# Patient Record
Sex: MALE | Race: BLACK OR AFRICAN AMERICAN | NOT HISPANIC OR LATINO | Employment: OTHER | RURAL
[De-identification: names, ages, dates, MRNs, and addresses within clinical notes are randomized per-mention and may not be internally consistent; named-entity substitution may affect disease eponyms.]

---

## 2024-03-10 ENCOUNTER — HOSPITAL ENCOUNTER (EMERGENCY)
Facility: HOSPITAL | Age: 39
Discharge: HOME | End: 2024-03-10
Attending: EMERGENCY MEDICINE

## 2024-03-10 VITALS
OXYGEN SATURATION: 100 % | TEMPERATURE: 98 F | DIASTOLIC BLOOD PRESSURE: 62 MMHG | BODY MASS INDEX: 20.49 KG/M2 | HEART RATE: 89 BPM | HEIGHT: 65 IN | SYSTOLIC BLOOD PRESSURE: 121 MMHG | WEIGHT: 123 LBS | RESPIRATION RATE: 20 BRPM

## 2024-03-10 DIAGNOSIS — B00.9 HSV (HERPES SIMPLEX VIRUS) INFECTION: Primary | ICD-10-CM

## 2024-03-10 PROCEDURE — 99284 EMERGENCY DEPT VISIT MOD MDM: CPT | Mod: ,,, | Performed by: EMERGENCY MEDICINE

## 2024-03-10 PROCEDURE — 99284 EMERGENCY DEPT VISIT MOD MDM: CPT

## 2024-03-10 PROCEDURE — 25000003 PHARM REV CODE 250: Performed by: EMERGENCY MEDICINE

## 2024-03-10 RX ORDER — ACYCLOVIR 800 MG/1
800 TABLET ORAL
Qty: 50 TABLET | Refills: 0 | Status: SHIPPED | OUTPATIENT
Start: 2024-03-10 | End: 2024-03-20

## 2024-03-10 RX ORDER — FLUCONAZOLE 200 MG/1
200 TABLET ORAL DAILY
Qty: 7 TABLET | Refills: 0 | Status: SHIPPED | OUTPATIENT
Start: 2024-03-10 | End: 2024-03-17

## 2024-03-10 RX ORDER — LIDOCAINE AND PRILOCAINE 25; 25 MG/G; MG/G
CREAM TOPICAL
Status: COMPLETED | OUTPATIENT
Start: 2024-03-10 | End: 2024-03-10

## 2024-03-10 RX ADMIN — LIDOCAINE AND PRILOCAINE: 25; 25 CREAM TOPICAL at 04:03

## 2024-03-10 NOTE — ED PROVIDER NOTES
Encounter Date: 3/10/2024    SCRIBE #1 NOTE: I, Marisa Kita, am scribing for, and in the presence of,  Satish Rico MD. I have scribed the entire note.       History     Chief Complaint   Patient presents with    Groin Swelling     Pt c/o genital swelling and pain.      39 year old male presents to the ED complaining of groin swelling. Patient reports having pain and swelling to his genitals. No other complaints at this time. He reports being HIV+, but his says his CD4 count and viral load are good.      The history is provided by the patient. No  was used.     Review of patient's allergies indicates:  No Known Allergies  No past medical history on file.  No past surgical history on file.  No family history on file.     Review of Systems   Genitourinary:  Positive for penile pain and penile swelling.   All other systems reviewed and are negative.      Physical Exam     Initial Vitals [03/10/24 1534]   BP Pulse Resp Temp SpO2   121/62 89 20 98.2 °F (36.8 °C) 100 %      MAP       --         Physical Exam    Nursing note and vitals reviewed.  Eyes: Conjunctivae are normal. Pupils are equal, round, and reactive to light.   Neck:   Normal range of motion.  Cardiovascular:  Regular rhythm and normal heart sounds.           Pulmonary/Chest: Breath sounds normal.   Genitourinary:    Genitourinary Comments: Ulceration on foreskin.      Musculoskeletal:      Cervical back: Normal range of motion.     Neurological: He is alert and oriented to person, place, and time. He has normal strength and normal reflexes.         ED Course   Procedures  Labs Reviewed - No data to display       Imaging Results    None          Medications   LIDOcaine-prilocaine cream (has no administration in time range)     Medical Decision Making  Painful penile ulceration    Ddx:  hsv vs other    Acyclovir and diflucan     Risk  Prescription drug management.              Attending Attestation:           Physician Attestation  for Scribe:  Physician Attestation Statement for Scribe #1: I, Satish Rico MD, reviewed documentation, as scribed by Marisa East in my presence, and it is both accurate and complete.                                    Clinical Impression:  Final diagnoses:  [B00.9] HSV (herpes simplex virus) infection (Primary)          ED Disposition Condition    Discharge Stable          ED Prescriptions       Medication Sig Dispense Start Date End Date Auth. Provider    fluconazole (DIFLUCAN) 200 MG Tab Take 1 tablet (200 mg total) by mouth once daily. for 7 days 7 tablet 3/10/2024 3/17/2024 Satish Rico MD    acyclovir (ZOVIRAX) 800 MG Tab Take 1 tablet (800 mg total) by mouth 5 (five) times daily. for 10 days 50 tablet 3/10/2024 3/20/2024 Satish Rico MD          Follow-up Information       Follow up With Specialties Details Why Contact Info    [facility] - Emergency Department Emergency Medicine  As needed [id number] [place] [id number] [phone]             Satish Rico MD  03/10/24 3497

## 2024-03-10 NOTE — DISCHARGE INSTRUCTIONS
Take diflucan and acyclovir as directed.  Drink plenty of fluids.  Follow up if symptoms persist or worsen or otherwise as needed.

## 2024-04-02 ENCOUNTER — HOSPITAL ENCOUNTER (EMERGENCY)
Facility: HOSPITAL | Age: 39
Discharge: HOME | End: 2024-04-02

## 2024-04-02 VITALS
WEIGHT: 140 LBS | DIASTOLIC BLOOD PRESSURE: 76 MMHG | BODY MASS INDEX: 22.5 KG/M2 | RESPIRATION RATE: 16 BRPM | SYSTOLIC BLOOD PRESSURE: 128 MMHG | HEIGHT: 66 IN | TEMPERATURE: 99 F | HEART RATE: 99 BPM | OXYGEN SATURATION: 99 %

## 2024-04-02 DIAGNOSIS — Z20.2 STD EXPOSURE: ICD-10-CM

## 2024-04-02 DIAGNOSIS — B20 HIV INFECTION, UNSPECIFIED SYMPTOM STATUS: ICD-10-CM

## 2024-04-02 DIAGNOSIS — N48.1: Primary | ICD-10-CM

## 2024-04-02 DIAGNOSIS — N47.1 PHIMOSIS OF PENIS: ICD-10-CM

## 2024-04-02 LAB
AMPHET UR QL SCN: POSITIVE
BARBITURATES UR QL SCN: NEGATIVE
BENZODIAZ METAB UR QL SCN: NEGATIVE
BILIRUB UR QL STRIP: NEGATIVE
CANNABINOIDS UR QL SCN: POSITIVE
CLARITY UR: CLEAR
COCAINE UR QL SCN: POSITIVE
COLOR UR: YELLOW
GLUCOSE UR STRIP-MCNC: NORMAL MG/DL
KETONES UR STRIP-SCNC: 10 MG/DL
LEUKOCYTE ESTERASE UR QL STRIP: ABNORMAL
MUCOUS, UA: ABNORMAL /LPF
NITRITE UR QL STRIP: NEGATIVE
OPIATES UR QL SCN: NEGATIVE
PCP UR QL SCN: NEGATIVE
PH UR STRIP: 6 PH UNITS
PROT UR QL STRIP: 50
RBC # UR STRIP: ABNORMAL /UL
RBC #/AREA URNS HPF: 37 /HPF
SP GR UR STRIP: 1.03
SQUAMOUS #/AREA URNS LPF: ABNORMAL /HPF
UROBILINOGEN UR STRIP-ACNC: 2 MG/DL
WBC #/AREA URNS HPF: 43 /HPF

## 2024-04-02 PROCEDURE — 99284 EMERGENCY DEPT VISIT MOD MDM: CPT | Mod: ,,, | Performed by: NURSE PRACTITIONER

## 2024-04-02 PROCEDURE — 63600175 PHARM REV CODE 636 W HCPCS: Performed by: NURSE PRACTITIONER

## 2024-04-02 PROCEDURE — 96372 THER/PROPH/DIAG INJ SC/IM: CPT | Performed by: NURSE PRACTITIONER

## 2024-04-02 PROCEDURE — 99284 EMERGENCY DEPT VISIT MOD MDM: CPT | Mod: 25

## 2024-04-02 PROCEDURE — 87491 CHLMYD TRACH DNA AMP PROBE: CPT | Performed by: NURSE PRACTITIONER

## 2024-04-02 PROCEDURE — 81003 URINALYSIS AUTO W/O SCOPE: CPT | Performed by: NURSE PRACTITIONER

## 2024-04-02 PROCEDURE — 87086 URINE CULTURE/COLONY COUNT: CPT | Performed by: NURSE PRACTITIONER

## 2024-04-02 PROCEDURE — 25000003 PHARM REV CODE 250: Performed by: NURSE PRACTITIONER

## 2024-04-02 PROCEDURE — 80307 DRUG TEST PRSMV CHEM ANLYZR: CPT | Performed by: NURSE PRACTITIONER

## 2024-04-02 RX ORDER — DOXYCYCLINE 100 MG/1
100 CAPSULE ORAL 2 TIMES DAILY
Qty: 20 CAPSULE | Refills: 0 | Status: SHIPPED | OUTPATIENT
Start: 2024-04-02 | End: 2024-04-12

## 2024-04-02 RX ORDER — METRONIDAZOLE 500 MG/1
500 TABLET ORAL 3 TIMES DAILY
Qty: 21 TABLET | Refills: 0 | Status: SHIPPED | OUTPATIENT
Start: 2024-04-02 | End: 2024-04-09

## 2024-04-02 RX ADMIN — LIDOCAINE HYDROCHLORIDE 1 G: 10 INJECTION, SOLUTION INFILTRATION; PERINEURAL at 07:04

## 2024-04-03 ENCOUNTER — HOSPITAL ENCOUNTER (EMERGENCY)
Facility: HOSPITAL | Age: 39
Discharge: HOME | End: 2024-04-03

## 2024-04-03 ENCOUNTER — TELEPHONE (OUTPATIENT)
Dept: EMERGENCY MEDICINE | Facility: HOSPITAL | Age: 39
End: 2024-04-03

## 2024-04-03 VITALS
SYSTOLIC BLOOD PRESSURE: 135 MMHG | TEMPERATURE: 98 F | OXYGEN SATURATION: 98 % | HEART RATE: 99 BPM | RESPIRATION RATE: 20 BRPM | WEIGHT: 128 LBS | BODY MASS INDEX: 20.57 KG/M2 | DIASTOLIC BLOOD PRESSURE: 93 MMHG | HEIGHT: 66 IN

## 2024-04-03 DIAGNOSIS — N48.1 BALANITIS: Primary | ICD-10-CM

## 2024-04-03 LAB
CHLAMYDIA BY PCR: NEGATIVE
N. GONORRHOEAE (GC) BY PCR: NEGATIVE

## 2024-04-03 PROCEDURE — 96372 THER/PROPH/DIAG INJ SC/IM: CPT | Performed by: NURSE PRACTITIONER

## 2024-04-03 PROCEDURE — 99284 EMERGENCY DEPT VISIT MOD MDM: CPT | Mod: 25

## 2024-04-03 PROCEDURE — 63600175 PHARM REV CODE 636 W HCPCS: Performed by: NURSE PRACTITIONER

## 2024-04-03 PROCEDURE — 25000003 PHARM REV CODE 250: Performed by: NURSE PRACTITIONER

## 2024-04-03 PROCEDURE — 99284 EMERGENCY DEPT VISIT MOD MDM: CPT | Mod: ,,, | Performed by: NURSE PRACTITIONER

## 2024-04-03 RX ORDER — NAPROXEN 375 MG/1
375 TABLET ORAL 2 TIMES DAILY PRN
Qty: 10 TABLET | Refills: 0 | Status: SHIPPED | OUTPATIENT
Start: 2024-04-03 | End: 2024-04-13

## 2024-04-03 RX ORDER — LIDOCAINE AND PRILOCAINE 25; 25 MG/G; MG/G
CREAM TOPICAL
Status: COMPLETED | OUTPATIENT
Start: 2024-04-03 | End: 2024-04-03

## 2024-04-03 RX ORDER — CEFUROXIME AXETIL 500 MG/1
500 TABLET ORAL EVERY 12 HOURS
Qty: 20 TABLET | Refills: 0 | Status: SHIPPED | OUTPATIENT
Start: 2024-04-03 | End: 2024-04-13

## 2024-04-03 RX ORDER — MORPHINE SULFATE 4 MG/ML
8 INJECTION, SOLUTION INTRAMUSCULAR; INTRAVENOUS
Status: COMPLETED | OUTPATIENT
Start: 2024-04-03 | End: 2024-04-03

## 2024-04-03 RX ORDER — FLUCONAZOLE 200 MG/1
200 TABLET ORAL DAILY
Qty: 7 TABLET | Refills: 0 | Status: SHIPPED | OUTPATIENT
Start: 2024-04-03 | End: 2024-04-10

## 2024-04-03 RX ADMIN — LIDOCAINE AND PRILOCAINE: 25; 25 CREAM TOPICAL at 09:04

## 2024-04-03 RX ADMIN — MORPHINE SULFATE 8 MG: 4 INJECTION INTRAVENOUS at 11:04

## 2024-04-03 NOTE — DISCHARGE INSTRUCTIONS
Follow-up with urology.Take doxycycline and flagyl as directed. Apply triple antibiotic ointment two times per day. Follow up with your primary care provider in 2 days. Return to the emergency department for any increase in symptoms or for any other new or worrisome symptoms.

## 2024-04-03 NOTE — PROVIDER PROGRESS NOTES - EMERGENCY DEPT.
Encounter Date: 4/3/2024    ED Physician Progress Notes        Discussed with Dr. Buckley.  Dr. Buckley at bedside, evaluated the patient.

## 2024-04-03 NOTE — Clinical Note
"Hector Gibson" Kami was seen and treated in our emergency department on 4/3/2024.  He may return to work on 04/04/2024.       If you have any questions or concerns, please don't hesitate to call.      Antonino MEEKS    "

## 2024-04-03 NOTE — ED PROVIDER NOTES
Encounter Date: 4/3/2024       History     Chief Complaint   Patient presents with    Penis Pain     39-year-old male presents to the emergency department to be evaluated for penile pain and swelling that began several days ago.  He has a history of HIV as well as herpes.  He reports he has been taking his acyclovir as directed.  He was evaluated in the emergency department yesterday and prescribed doxycycline and Flagyl after he received Rocephin in the emergency department.    The history is provided by the patient.   Penis Pain  This is a recurrent problem. Pertinent negatives include no chest pain, no abdominal pain, no headaches and no shortness of breath.     Review of patient's allergies indicates:  No Known Allergies  History reviewed. No pertinent past medical history.  History reviewed. No pertinent surgical history.  History reviewed. No pertinent family history.     Review of Systems   Constitutional:  Negative for chills, fatigue and fever.   Respiratory:  Negative for shortness of breath.    Cardiovascular:  Negative for chest pain.   Gastrointestinal:  Negative for abdominal pain, constipation, diarrhea, nausea and vomiting.   Genitourinary:  Positive for penile pain.   Neurological:  Negative for headaches.   All other systems reviewed and are negative.      Physical Exam     Initial Vitals [04/03/24 1015]   BP Pulse Resp Temp SpO2   (!) 135/93 99 20 98.2 °F (36.8 °C) 98 %      MAP       --         Physical Exam    Vitals reviewed.  Constitutional: He appears well-developed and well-nourished.   Neck: Neck supple.   Cardiovascular:  Normal rate and regular rhythm.           Pulmonary/Chest: Breath sounds normal.   Abdominal: Abdomen is soft. Bowel sounds are normal. He exhibits no distension and no mass. There is no abdominal tenderness. There is no rebound and no guarding.   Genitourinary:    Testes normal.   Uncircumcised. Penile tenderness present. Discharge found.    Genitourinary Comments:  Chaperone: Gladys Floyd CNA     Musculoskeletal:         General: Normal range of motion.      Cervical back: Neck supple.     Neurological: He is alert and oriented to person, place, and time. He has normal strength. GCS score is 15. GCS eye subscore is 4. GCS verbal subscore is 5. GCS motor subscore is 6.   Skin: Skin is warm and dry. Capillary refill takes less than 2 seconds.   Psychiatric: He has a normal mood and affect.         Medical Screening Exam   See Full Note    ED Course   Procedures  Labs Reviewed - No data to display       Imaging Results    None          Medications   LIDOcaine-prilocaine cream (has no administration in time range)   morphine injection 8 mg (8 mg Intramuscular Given 4/3/24 1117)     Medical Decision Making  39-year-old male presents to the emergency department to be evaluated for penile pain and swelling that began several days ago.  He has a history of HIV as well as herpes.  He reports he has been taking his acyclovir as directed.  He was evaluated in the emergency department yesterday and prescribed doxycycline and Flagyl after he received Rocephin in the emergency department.  Treated in the emergency department with morphine IM and topical lidocaine  Discussed with Dr. Buckley.  Dr. Buckley at bedside, evaluated the patient; he recommended Diflucan, naproxen, take doxycycline and Flagyl as directed, follow-up with urology  Diagnosis: Balanitis    Risk  Prescription drug management.                                      Clinical Impression:   Final diagnoses:  [N48.1] Balanitis (Primary)        ED Disposition Condition    Discharge Stable          ED Prescriptions       Medication Sig Dispense Start Date End Date Auth. Provider    fluconazole (DIFLUCAN) 200 MG Tab Take 1 tablet (200 mg total) by mouth once daily. for 7 days 7 tablet 4/3/2024 4/10/2024 Tabitha Mendoza, ZEENAT    naproxen (NAPROSYN) 375 MG tablet Take 1 tablet (375 mg total) by mouth 2 (two) times daily as  needed. 10 tablet 4/3/2024 4/13/2024 Tabitha Mendoza FNP          Follow-up Information    None          Tabitha Mendoza, ZEENAT  04/03/24 1236

## 2024-04-03 NOTE — ED PROVIDER NOTES
Encounter Date: 4/2/2024       History   No chief complaint on file.    Patient presents to the ER with complaint of painful swollen penis.  Patient reports his symptoms been ongoing for proximally 1 month.  Patient reports lesions on the head of his penis that has been off and on for several months.  He states he has been seen by several doctors in several emergency rooms over the last month.  Patient reports he has been treated for urinary tract infection but has not taken any of the medication that has been prescribed for him.  Patient was states his symptoms have worsened over the last week.  He denies fever.  He reports pain with urination.  He reports serous bloody discharge from his penis.  Patient was states he was unable to retract his foreskin to wash her clean himself.  He states it was very painful.  He reports having unprotected intercourse.  Denies any rectal bleeding or discharge.    The history is provided by the patient. No  was used.     Review of patient's allergies indicates:  No Known Allergies  History reviewed. No pertinent past medical history.  History reviewed. No pertinent surgical history.  History reviewed. No pertinent family history.     Review of Systems   Constitutional:  Positive for activity change and fatigue.   Genitourinary:  Positive for dysuria, penile discharge, penile pain and penile swelling.   All other systems reviewed and are negative.      Physical Exam     Initial Vitals [04/02/24 1747]   BP Pulse Resp Temp SpO2   134/82 (!) 115 14 98.7 °F (37.1 °C) 98 %      MAP       --         Physical Exam    Nursing note and vitals reviewed.  Constitutional: He appears well-developed and well-nourished.   HENT:   Head: Normocephalic.   Nose: Nose normal.   Mouth/Throat: Oropharynx is clear and moist.   Neck:   Normal range of motion.  Cardiovascular:  Normal rate and intact distal pulses.           Pulmonary/Chest: Breath sounds normal.   Abdominal: Abdomen is  soft. Bowel sounds are normal.   Genitourinary:    Testes normal.   Uncircumcised. Phimosis and penile tenderness present. Discharge found.       Musculoskeletal:         General: Normal range of motion.      Cervical back: Normal range of motion.     Neurological: He is alert and oriented to person, place, and time. He has normal strength. GCS score is 15. GCS eye subscore is 4. GCS verbal subscore is 5. GCS motor subscore is 6.   Skin: Skin is warm and dry. Capillary refill takes less than 2 seconds.   Psychiatric: He has a normal mood and affect. His behavior is normal. Judgment and thought content normal.         Medical Screening Exam   See Full Note    ED Course   Procedures  Labs Reviewed   URINALYSIS, REFLEX TO URINE CULTURE - Abnormal; Notable for the following components:       Result Value    Leukocytes, UA Large (*)     Protein, UA 50 (*)     Ketones, UA 10 (*)     Urobilinogen, UA 2 (*)     Blood, UA Moderate (*)     Specific Gravity, UA 1.032 (*)     All other components within normal limits   DRUG SCREEN, URINE (BEAKER) - Abnormal; Notable for the following components:    Amphetamine, Urine Positive (*)     Cannabinoid, Urine Positive (*)     Cocaine, Urine Positive (*)     All other components within normal limits   URINALYSIS, MICROSCOPIC - Abnormal; Notable for the following components:    WBC, UA 43 (*)     RBC, UA 37 (*)     Squamous Epithelial Cells, UA Occasional (*)     Mucous Occasional (*)     All other components within normal limits   CHLAMYDIA/GONORRHOEAE(GC), PCR   CULTURE, URINE          Imaging Results    None          Medications   cefTRIAXone (Rocephin) 1 g in LIDOcaine HCL 10 mg/ml (1%) 4 mL IM only syringe (1 g Intramuscular Given 4/2/24 1902)     Medical Decision Making  Patient presents to the ER with complaint of painful swollen penis.  Patient reports his symptoms been ongoing for proximally 1 month.  Patient reports lesions on the head of his penis that has been off and on for  several months.  He states he has been seen by several doctors in several emergency rooms over the last month.  Patient reports he has been treated for urinary tract infection but has not taken any of the medication that has been prescribed for him.  Patient was states his symptoms have worsened over the last week.  He denies fever.  He reports pain with urination.  He reports serous bloody discharge from his penis.  Patient was states he was unable to retract his foreskin to wash her clean himself.  He states it was very painful.  He reports having unprotected intercourse.  Denies any rectal bleeding or discharge.      Amount and/or Complexity of Data Reviewed  External Data Reviewed: labs.     Details: Patient was history of HIV.  Patient became angry when questioned if he was taking his HIV medications.  Labs: ordered. Decision-making details documented in ED Course.  Discussion of management or test interpretation with external provider(s): Rocephin 1 g IM to treat balanitis and STD exposure.    Patient was discharged home with diagnosis of acute infected balanitis, STD exposure, phimosis of the penis and HIV infection.  He was given prescription for Flagyl and doxycycline and instructed to take medications as prescribed.  He was told to avoid sexual intercourse until all medications have been completed.  He was told to follow up with his Infectious Disease doctor at [facility] for referral to Urology.    Patient was very inappropriate during the entire exam.  Male nurse was in the room with me patient was inappropriately touching himself and attempting to touch me and the nurse.  Patient was asked to refrain from touching himself and to cover his genitals.  Patient continued inappropriate behavior.    Risk  Prescription drug management.                                      Clinical Impression:   Final diagnoses:  [N48.1] Acute infective balanitis (Primary)  [Z20.2] STD exposure  [N47.1] Phimosis of penis  [B20] HIV  infection, unspecified symptom status        ED Disposition Condition    Discharge Stable          ED Prescriptions       Medication Sig Dispense Start Date End Date Auth. Provider    metroNIDAZOLE (FLAGYL) 500 MG tablet Take 1 tablet (500 mg total) by mouth 3 (three) times daily. for 7 days 21 tablet 4/2/2024 4/9/2024 Annette Stanton FNP    doxycycline (VIBRAMYCIN) 100 MG Cap Take 1 capsule (100 mg total) by mouth 2 (two) times daily. for 10 days 20 capsule 4/2/2024 4/12/2024 Annette Stanton FNP          Follow-up Information    None          Annette Stanton FNP  04/03/24 0033

## 2024-04-03 NOTE — DISCHARGE INSTRUCTIONS
Take medication as prescribed.  Follow up with infectious disease doctor in [place] as previously scheduled. You will need referral to Urology there.  Return to the ER with new or worsening symptoms.

## 2024-04-03 NOTE — TELEPHONE ENCOUNTER
----- Message from ZEENAT Warren sent at 4/3/2024  2:48 PM CDT -----  Please notify him that he needs to stop the doxycycline and take Ceftin

## 2024-04-03 NOTE — ED TRIAGE NOTES
Pt presents to ed via pov, c/o pain to his penis. Pt has not been to  medications from yesterday.

## 2024-04-03 NOTE — ED NOTES
This RN was in another triage room at this time and heard aggressive conversation between this patient and . This RN was currently with another patient at the time of this conversation and was not able to talk to this patient. Security escorted patient off of property.

## 2024-04-04 PROBLEM — I10 ESSENTIAL HYPERTENSION: Status: ACTIVE | Noted: 2017-12-27

## 2024-04-04 LAB — UA COMPLETE W REFLEX CULTURE PNL UR: ABNORMAL

## 2024-04-21 ENCOUNTER — HOSPITAL ENCOUNTER (EMERGENCY)
Facility: HOSPITAL | Age: 39
Discharge: HOME | End: 2024-04-21

## 2024-04-21 VITALS
BODY MASS INDEX: 19.61 KG/M2 | SYSTOLIC BLOOD PRESSURE: 141 MMHG | TEMPERATURE: 98 F | HEART RATE: 74 BPM | OXYGEN SATURATION: 99 % | DIASTOLIC BLOOD PRESSURE: 99 MMHG | HEIGHT: 66 IN | RESPIRATION RATE: 16 BRPM | WEIGHT: 122 LBS

## 2024-04-21 DIAGNOSIS — Z59.00 HOMELESS: ICD-10-CM

## 2024-04-21 DIAGNOSIS — F19.10 POLYSUBSTANCE ABUSE: Primary | ICD-10-CM

## 2024-04-21 PROCEDURE — 99281 EMR DPT VST MAYX REQ PHY/QHP: CPT

## 2024-04-21 PROCEDURE — 99284 EMERGENCY DEPT VISIT MOD MDM: CPT | Mod: ,,, | Performed by: NURSE PRACTITIONER

## 2024-04-21 SDOH — SOCIAL DETERMINANTS OF HEALTH (SDOH): HOMELESSNESS UNSPECIFIED: Z59.00

## 2024-04-22 NOTE — DISCHARGE INSTRUCTIONS
Follow up with the primary care provider as needed.  Follow up with [facility] for help with your drug addiction.

## 2024-04-22 NOTE — ED TRIAGE NOTES
Pt presents cc of right hip pain that he injured years ago and will hurt at random times. Pt states that he took naproxen about 30 mins ago for the pain

## 2024-04-22 NOTE — ED PROVIDER NOTES
Encounter Date: 4/21/2024       History     Chief Complaint   Patient presents with    Hip Pain     right     Patient presents ambulatory to the ER.  He reports complaint of right hip pain to triage nurse.  Patient is sleeping upon my arrival in room for exam.  Patient was not cooperative with exam.  He denies pain.  He refuses to answer questions.  He was aggressive and agitated by further evaluation.  Patient was has been seen multiple times in the ER over the last month related to balanitis and penile discharge.  He will not answer questions regarding those symptoms or previous treatment.  Patient was known history of HIV infection.  He will not answer questions about medications or current treatment.    The history is provided by the patient. No  was used.     Review of patient's allergies indicates:  No Known Allergies  No past medical history on file.  No past surgical history on file.  No family history on file.     Review of Systems   Musculoskeletal:  Positive for arthralgias (Initially complained of right hip pain in triage denies right hip pain in exam room).   Psychiatric/Behavioral:  Positive for agitation and dysphoric mood.    All other systems reviewed and are negative.      Physical Exam     Initial Vitals [04/21/24 2048]   BP Pulse Resp Temp SpO2   (!) 141/99 74 16 97.6 °F (36.4 °C) 99 %      MAP       --         Physical Exam    Nursing note and vitals reviewed.  Constitutional: Vital signs are normal. He appears well-developed and well-nourished. He is sleeping and uncooperative. He is easily aroused.   HENT:   Head: Normocephalic.   Nose: Nose normal.   Mouth/Throat: Oropharynx is clear and moist.   Eyes: EOM are normal.   Neck:   Normal range of motion.  Cardiovascular:  Normal rate.           Pulmonary/Chest: Breath sounds normal.   Musculoskeletal:         General: Normal range of motion.      Cervical back: Normal range of motion.     Neurological: He is oriented to  person, place, and time and easily aroused. He has normal strength. GCS score is 15. GCS eye subscore is 4. GCS verbal subscore is 5. GCS motor subscore is 6.   Skin: Skin is warm and dry. Capillary refill takes less than 2 seconds.   Psychiatric: His affect is angry. He is agitated and aggressive. He is noncommunicative.         Medical Screening Exam   See Full Note    ED Course   Procedures  Labs Reviewed - No data to display       Imaging Results    None          Medications - No data to display  Medical Decision Making  Patient presents ambulatory to the ER.  He reports complaint of right hip pain to triage nurse.  Patient is sleeping upon my arrival in room for exam.  Patient was not cooperative with exam.  He denies pain.  He refuses to answer questions.  He was aggressive and agitated by further evaluation.  Patient was has been seen multiple times in the ER over the last month related to balanitis and penile discharge.  He will not answer questions regarding those symptoms or previous treatment.  Patient was known history of HIV infection.  He will not answer questions about medications or current treatment.      Amount and/or Complexity of Data Reviewed  External Data Reviewed: labs and notes.     Details: Three ER visits in the last month reviewed.  Discussion of management or test interpretation with external provider(s): Patient was discharged home with diagnosis of polysubstance abuse and homeless.  He will not cooperate with exam for further evaluation or treatment.  Patient is given discharge instructions to follow up with his primary care provider.  He was escorted from the ER by security.                                      Clinical Impression:   Final diagnoses:  [F19.10] Polysubstance abuse (Primary)  [Z59.00] Homeless        ED Disposition Condition    Discharge Stable          ED Prescriptions    None       Follow-up Information       Follow up With Specialties Details Why Contact Info     [facility]  Schedule an appointment as soon as possible for a visit   [address] [place] [address] [address] [place] [address]  [phone]               Annette Stanton, FNP  04/21/24 9632

## 2024-06-03 ENCOUNTER — HOSPITAL ENCOUNTER (EMERGENCY)
Facility: HOSPITAL | Age: 39
Discharge: HOME | End: 2024-06-03

## 2024-06-03 VITALS
SYSTOLIC BLOOD PRESSURE: 108 MMHG | OXYGEN SATURATION: 97 % | WEIGHT: 128 LBS | BODY MASS INDEX: 20.57 KG/M2 | TEMPERATURE: 99 F | HEART RATE: 122 BPM | DIASTOLIC BLOOD PRESSURE: 82 MMHG | RESPIRATION RATE: 18 BRPM | HEIGHT: 66 IN

## 2024-06-03 DIAGNOSIS — N48.1 BALANITIS: Primary | ICD-10-CM

## 2024-06-03 PROCEDURE — 99284 EMERGENCY DEPT VISIT MOD MDM: CPT

## 2024-06-03 RX ORDER — FLUCONAZOLE 150 MG/1
150 TABLET ORAL DAILY
Qty: 1 TABLET | Refills: 0 | Status: SHIPPED | OUTPATIENT
Start: 2024-06-03 | End: 2024-06-04

## 2024-06-03 RX ORDER — LIDOCAINE HYDROCHLORIDE AND HYDROCORTISONE ACETATE 30; 5 MG/G; MG/G
1 CREAM TOPICAL 2 TIMES DAILY
Qty: 28.3 G | Refills: 0 | Status: SHIPPED | OUTPATIENT
Start: 2024-06-03

## 2024-06-03 RX ORDER — DOXYCYCLINE 100 MG/1
100 CAPSULE ORAL 2 TIMES DAILY
Qty: 20 CAPSULE | Refills: 0 | Status: SHIPPED | OUTPATIENT
Start: 2024-06-03 | End: 2024-06-13

## 2024-06-03 NOTE — ED PROVIDER NOTES
Encounter Date: 6/3/2024       History     Chief Complaint   Patient presents with    Penis Pain     HPI    Patient is a 39-year-old black male who presents to the emergency department with complaints of penile pain. Multiple ER visits for the same thing.  He is not compliant with his medications.  Review of patient's allergies indicates:  No Known Allergies  History reviewed. No pertinent past medical history.  History reviewed. No pertinent surgical history.  No family history on file.  Social History     Tobacco Use    Smoking status: Every Day     Types: Cigars    Smokeless tobacco: Never   Substance Use Topics    Alcohol use: Yes    Drug use: Yes     Types: Marijuana     Review of Systems   Genitourinary:  Positive for genital sores and penile pain. Negative for decreased urine volume, difficulty urinating, dysuria, enuresis, flank pain, frequency, hematuria, penile discharge, penile swelling, scrotal swelling, testicular pain and urgency.       Physical Exam     Initial Vitals [06/03/24 1702]   BP Pulse Resp Temp SpO2   108/82 (!) 122 18 99.4 °F (37.4 °C) 97 %      MAP       --         Physical Exam    Constitutional: He appears well-developed and well-nourished.   HENT:   Head: Normocephalic.   Cardiovascular:  Normal rate.           Pulmonary/Chest: No respiratory distress.   Abdominal: Abdomen is soft.   Genitourinary:    Genitourinary Comments: Ulcer to head of penis. Chaperone: Zuleima       Neurological: He is alert and oriented to person, place, and time. GCS score is 15. GCS eye subscore is 4. GCS verbal subscore is 5. GCS motor subscore is 6.   Skin: Skin is warm and dry.   Psychiatric: He has a normal mood and affect. His behavior is normal. Judgment and thought content normal.         Medical Screening Exam   See Full Note    ED Course   Procedures  Labs Reviewed - No data to display       Imaging Results    None          Medications - No data to display  Medical Decision Making                MDM:    39-year-old with penis pain, history of balanitis,  noncompliant with treatment, never followed up in clinic              Clinical Impression:   Final diagnoses:  [N48.1] Balanitis (Primary)               Marisa Cid, [facility]  06/03/24 1723

## 2024-06-03 NOTE — DISCHARGE INSTRUCTIONS
Take medications as directed.     Follow-up with primary care provider tomorrow.     Return to the emergency department for new or worsening symptoms.

## 2024-06-03 NOTE — ED TRIAGE NOTES
Pt presents to ED via POV with c/o penis pain. Pt reports recently being diagnosed with herpes and thinks he might having a flare up.

## 2024-07-07 ENCOUNTER — HOSPITAL ENCOUNTER (EMERGENCY)
Facility: HOSPITAL | Age: 39
Discharge: HOME | End: 2024-07-07
Attending: EMERGENCY MEDICINE

## 2024-07-07 VITALS
DIASTOLIC BLOOD PRESSURE: 88 MMHG | HEART RATE: 84 BPM | HEIGHT: 66 IN | WEIGHT: 129 LBS | OXYGEN SATURATION: 99 % | BODY MASS INDEX: 20.73 KG/M2 | SYSTOLIC BLOOD PRESSURE: 127 MMHG | TEMPERATURE: 99 F | RESPIRATION RATE: 18 BRPM

## 2024-07-07 DIAGNOSIS — K29.20 ALCOHOLIC GASTRITIS WITHOUT BLEEDING, UNSPECIFIED CHRONICITY: ICD-10-CM

## 2024-07-07 DIAGNOSIS — R10.84 GENERALIZED ABDOMINAL PAIN: Primary | ICD-10-CM

## 2024-07-07 PROCEDURE — 99283 EMERGENCY DEPT VISIT LOW MDM: CPT

## 2024-07-07 RX ORDER — LANSOPRAZOLE 30 MG/1
30 CAPSULE, DELAYED RELEASE ORAL DAILY
Qty: 30 CAPSULE | Refills: 11 | Status: SHIPPED | OUTPATIENT
Start: 2024-07-07 | End: 2025-07-07

## 2024-07-07 NOTE — DISCHARGE INSTRUCTIONS
Take medication as prescribed.  You may also try liquid Mylanta or liquid Pepto-Bismol to help with pain.  Stop drinking alcohol.  Return to emergency department for any worsening or further problems.  Follow up in clinic with primary care provider in 2-3 days for recheck if symptoms persist.

## 2024-07-07 NOTE — ED PROVIDER NOTES
Encounter Date: 7/7/2024    SCRIBE #1 NOTE: I, Kathleen Shah, am scribing for, and in the presence of,  Joe Payton MD. I have scribed the entire note.       History     Chief Complaint   Patient presents with    Abdominal Pain     Pt c/o generalized abd pain since 7/4. Pt states he had a bm today. Denies n/v.      39 y.o.male presented to the ED for abdominal pains that started on  the 4th of July. He denies vomiting or diarrhea. PT stated it's a sharp pain that last 3 to 5 minutes and he has taken Pepto bis mal.  Pains occur about 2 or 3 times a day.  No current pain.  Patient admits drinking about a pt of liquor a day.  Patient also smokes and uses marijuana 3 times a day.        Review of patient's allergies indicates:  No Known Allergies  History reviewed. No pertinent past medical history.  History reviewed. No pertinent surgical history.  No family history on file.  Social History     Tobacco Use    Smoking status: Every Day     Types: Cigars    Smokeless tobacco: Never   Substance Use Topics    Alcohol use: Yes    Drug use: Yes     Types: Marijuana     Review of Systems   Gastrointestinal:  Positive for abdominal pain. Negative for diarrhea, nausea and vomiting.   Musculoskeletal:  Negative for back pain.   All other systems reviewed and are negative.      Physical Exam     Initial Vitals [07/07/24 0042]   BP Pulse Resp Temp SpO2   127/88 84 18 99.2 °F (37.3 °C) 99 %      MAP       --         Physical Exam    Nursing note and vitals reviewed.  HENT:   Head: Normocephalic and atraumatic.   Mouth/Throat: Oropharynx is clear and moist.   Eyes: Pupils are equal, round, and reactive to light.   Neck: Neck supple.   Normal range of motion.  Cardiovascular:  Normal rate and regular rhythm.           Pulmonary/Chest: Effort normal and breath sounds normal.   Abdominal: Abdomen is soft. He exhibits no distension.   Musculoskeletal:         General: Normal range of motion.      Cervical back: Normal range of  motion and neck supple.     Neurological: He is alert.   Skin: Skin is warm. Capillary refill takes less than 2 seconds.   Psychiatric: He has a normal mood and affect.         ED Course   Procedures  Labs Reviewed - No data to display       Imaging Results    None          Medications - No data to display  Medical Decision Making  Risk  Prescription drug management.              Attending Attestation:           Physician Attestation for Scribe:  Physician Attestation Statement for Scribe #1: I, Joe Payton MD, reviewed documentation, as scribed by Kathleen Shah in my presence, and it is both accurate and complete.             ED Course as of 07/07/24 0110   Sun Jul 07, 2024   0108 Medical decision-making:  Differential diagnosis includes abdominal pain, gastritis, alcoholic gastritis, alcohol abuse.  No labs or imaging were performed on this patient. [BB]      ED Course User Index  [BB] Joe Payton MD                           Clinical Impression:  Final diagnoses:  [R10.84] Generalized abdominal pain (Primary)  [K29.20] Alcoholic gastritis without bleeding, unspecified chronicity          ED Disposition Condition    Discharge Stable          ED Prescriptions       Medication Sig Dispense Start Date End Date Auth. Provider    lansoprazole (PREVACID) 30 MG capsule Take 1 capsule (30 mg total) by mouth once daily. 30 capsule 7/7/2024 7/7/2025 Joe Payton MD          Follow-up Information    None          Joe Payton MD  07/07/24 0110

## 2024-07-08 ENCOUNTER — TELEPHONE (OUTPATIENT)
Dept: EMERGENCY MEDICINE | Facility: HOSPITAL | Age: 39
End: 2024-07-08

## 2025-01-29 ENCOUNTER — HOSPITAL ENCOUNTER (EMERGENCY)
Facility: HOSPITAL | Age: 40
Discharge: HOME | End: 2025-01-29

## 2025-01-29 VITALS
SYSTOLIC BLOOD PRESSURE: 118 MMHG | HEART RATE: 74 BPM | OXYGEN SATURATION: 98 % | RESPIRATION RATE: 16 BRPM | BODY MASS INDEX: 23.46 KG/M2 | HEIGHT: 66 IN | DIASTOLIC BLOOD PRESSURE: 70 MMHG | TEMPERATURE: 98 F | WEIGHT: 146 LBS

## 2025-01-29 DIAGNOSIS — B34.9 VIRAL SYNDROME: Primary | ICD-10-CM

## 2025-01-29 LAB
INFLUENZA A MOLECULAR (OHS): NEGATIVE
INFLUENZA B MOLECULAR (OHS): NEGATIVE
SARS-COV-2 RDRP RESP QL NAA+PROBE: NEGATIVE

## 2025-01-29 PROCEDURE — 87502 INFLUENZA DNA AMP PROBE: CPT | Performed by: NURSE PRACTITIONER

## 2025-01-29 PROCEDURE — 87635 SARS-COV-2 COVID-19 AMP PRB: CPT | Performed by: NURSE PRACTITIONER

## 2025-01-29 PROCEDURE — 99282 EMERGENCY DEPT VISIT SF MDM: CPT

## 2025-01-30 NOTE — ED PROVIDER NOTES
Encounter Date: 1/29/2025       History     Chief Complaint   Patient presents with    Fever     Has been feeling bad for 2 days.  Chills. Bodyaches     40 year old male presents to ED with complaint of fever and body aches. He reports he has had symptoms for approximately 2 days. Temp max unknown; did not obtain. He reports generalized body aches. Denies chest pain, shortness of breath, nausea/vomiting, diarrhea. Reports sick contacts with his mom and dad. PMH of HIV    The history is provided by the patient.     Review of patient's allergies indicates:  No Known Allergies  Past Medical History:   Diagnosis Date    Human immunodeficiency virus (HIV) disease      History reviewed. No pertinent surgical history.  No family history on file.  Social History     Tobacco Use    Smoking status: Every Day     Types: Cigars    Smokeless tobacco: Never   Substance Use Topics    Alcohol use: Yes    Drug use: Yes     Types: Marijuana     Review of Systems   Constitutional:  Positive for chills. Negative for fever.   Eyes:  Negative for photophobia and visual disturbance.   Respiratory:  Negative for cough and shortness of breath.    Cardiovascular:  Negative for chest pain and palpitations.   Gastrointestinal:  Negative for nausea and vomiting.   Musculoskeletal:  Positive for arthralgias. Negative for myalgias.   Skin:  Negative for color change and wound.   Neurological:  Negative for dizziness and weakness.   Hematological:  Negative for adenopathy. Does not bruise/bleed easily.   Psychiatric/Behavioral:  Negative for agitation and confusion.    All other systems reviewed and are negative.      Physical Exam     Initial Vitals [01/29/25 1726]   BP Pulse Resp Temp SpO2   118/70 74 16 98 °F (36.7 °C) 98 %      MAP       --         Physical Exam    Nursing note and vitals reviewed.  Constitutional: He appears well-developed and well-nourished.   HENT:   Head: Normocephalic and atraumatic.   Eyes: EOM are normal. Pupils are  equal, round, and reactive to light.   Neck: Neck supple.   Normal range of motion.  Cardiovascular:  Normal rate and regular rhythm.           No murmur heard.  Pulmonary/Chest: He has no wheezes. He has no rhonchi.   Abdominal: Abdomen is soft. He exhibits no distension.   Musculoskeletal:         General: No tenderness or edema.      Cervical back: Normal range of motion and neck supple.     Lymphadenopathy:     He has no cervical adenopathy.   Neurological: He is alert and oriented to person, place, and time. No cranial nerve deficit or sensory deficit.   Skin: Skin is warm and dry. Capillary refill takes less than 2 seconds.   Psychiatric: He has a normal mood and affect. Thought content normal.         Medical Screening Exam   See Full Note    ED Course   Procedures  Labs Reviewed   INFLUENZA A & B BY MOLECULAR - Normal       Result Value    INFLUENZA A MOLECULAR Negative      INFLUENZA B MOLECULAR  Negative     SARS-COV-2 RNA AMPLIFICATION, QUAL - Normal    SARS COV-2 Molecular Negative      Narrative:     Negative SARS-CoV results should not be used as the sole basis for treatment or patient management decisions; negative results should be considered in the context of a patient's recent exposures, history and the presene of clinical signs and symptoms consistent with COVID-19.  Negative results should be treated as presumptive and confirmed by molecular assay, if necessary for patient management.          Imaging Results    None          Medications - No data to display  Medical Decision Making  40 year old male presents to ED with complaint of fever and body aches. He reports he has had symptoms for approximately 2 days. Temp max unknown; did not obtain. He reports generalized body aches. Denies chest pain, shortness of breath, nausea/vomiting, diarrhea. Reports sick contacts with his mom and dad. [facility] of HIV    Labs obtained and reviewed                                      Clinical Impression:   Final  diagnoses:  [B34.9] Viral syndrome (Primary)        ED Disposition Condition    Discharge Stable          ED Prescriptions    None       Follow-up Information    None          Cassandra Wright, [facility]  01/29/25 [id number]